# Patient Record
Sex: MALE | Race: WHITE | NOT HISPANIC OR LATINO | Employment: FULL TIME | URBAN - METROPOLITAN AREA
[De-identification: names, ages, dates, MRNs, and addresses within clinical notes are randomized per-mention and may not be internally consistent; named-entity substitution may affect disease eponyms.]

---

## 2017-11-27 ENCOUNTER — HOSPITAL ENCOUNTER (OUTPATIENT)
Facility: HOSPITAL | Age: 27
Setting detail: OUTPATIENT SURGERY
Discharge: HOME | End: 2017-11-28
Attending: EMERGENCY MEDICINE | Admitting: SURGERY
Payer: COMMERCIAL

## 2017-11-27 ENCOUNTER — OFFICE VISIT (OUTPATIENT)
Dept: URGENT CARE | Facility: MEDICAL CENTER | Age: 27
End: 2017-11-27
Payer: COMMERCIAL

## 2017-11-27 ENCOUNTER — APPOINTMENT (EMERGENCY)
Dept: CT IMAGING | Facility: HOSPITAL | Age: 27
End: 2017-11-27
Payer: COMMERCIAL

## 2017-11-27 DIAGNOSIS — K37 APPENDICITIS: Primary | ICD-10-CM

## 2017-11-27 LAB
ALBUMIN SERPL BCP-MCNC: 4.5 G/DL (ref 3.5–5)
ALP SERPL-CCNC: 68 U/L (ref 46–116)
ALT SERPL W P-5'-P-CCNC: 52 U/L (ref 12–78)
ANION GAP SERPL CALCULATED.3IONS-SCNC: 12 MMOL/L (ref 4–13)
AST SERPL W P-5'-P-CCNC: 31 U/L (ref 5–45)
BACTERIA UR QL AUTO: ABNORMAL /HPF
BASOPHILS # BLD AUTO: 0.02 THOUSANDS/ΜL (ref 0–0.1)
BASOPHILS NFR BLD AUTO: 0 % (ref 0–1)
BILIRUB SERPL-MCNC: 0.6 MG/DL (ref 0.2–1)
BILIRUB UR QL STRIP: NEGATIVE
BUN SERPL-MCNC: 18 MG/DL (ref 5–25)
CALCIUM SERPL-MCNC: 9.6 MG/DL (ref 8.3–10.1)
CHLORIDE SERPL-SCNC: 99 MMOL/L (ref 100–108)
CLARITY UR: CLEAR
CO2 SERPL-SCNC: 26 MMOL/L (ref 21–32)
COLOR UR: YELLOW
CREAT SERPL-MCNC: 0.92 MG/DL (ref 0.6–1.3)
EOSINOPHIL # BLD AUTO: 0.06 THOUSAND/ΜL (ref 0–0.61)
EOSINOPHIL NFR BLD AUTO: 0 % (ref 0–6)
ERYTHROCYTE [DISTWIDTH] IN BLOOD BY AUTOMATED COUNT: 12.6 % (ref 11.6–15.1)
GFR SERPL CREATININE-BSD FRML MDRD: 116 ML/MIN/1.73SQ M
GLUCOSE SERPL-MCNC: 126 MG/DL (ref 65–140)
GLUCOSE UR STRIP-MCNC: NEGATIVE MG/DL
HCT VFR BLD AUTO: 45.3 % (ref 36.5–49.3)
HGB BLD-MCNC: 15.7 G/DL (ref 12–17)
HGB UR QL STRIP.AUTO: ABNORMAL
KETONES UR STRIP-MCNC: ABNORMAL MG/DL
LACTATE SERPL-SCNC: 0.9 MMOL/L (ref 0.5–2)
LEUKOCYTE ESTERASE UR QL STRIP: NEGATIVE
LIPASE SERPL-CCNC: 88 U/L (ref 73–393)
LYMPHOCYTES # BLD AUTO: 1.82 THOUSANDS/ΜL (ref 0.6–4.47)
LYMPHOCYTES NFR BLD AUTO: 12 % (ref 14–44)
MCH RBC QN AUTO: 31.6 PG (ref 26.8–34.3)
MCHC RBC AUTO-ENTMCNC: 34.7 G/DL (ref 31.4–37.4)
MCV RBC AUTO: 91 FL (ref 82–98)
MONOCYTES # BLD AUTO: 0.93 THOUSAND/ΜL (ref 0.17–1.22)
MONOCYTES NFR BLD AUTO: 6 % (ref 4–12)
MUCOUS THREADS UR QL AUTO: ABNORMAL
NEUTROPHILS # BLD AUTO: 12.86 THOUSANDS/ΜL (ref 1.85–7.62)
NEUTS SEG NFR BLD AUTO: 82 % (ref 43–75)
NITRITE UR QL STRIP: NEGATIVE
NON-SQ EPI CELLS URNS QL MICRO: ABNORMAL /HPF
PH UR STRIP.AUTO: 5.5 [PH] (ref 4.5–8)
PLATELET # BLD AUTO: 186 THOUSANDS/UL (ref 149–390)
PMV BLD AUTO: 12.3 FL (ref 8.9–12.7)
POTASSIUM SERPL-SCNC: 4.2 MMOL/L (ref 3.5–5.3)
PROT SERPL-MCNC: 7.8 G/DL (ref 6.4–8.2)
PROT UR STRIP-MCNC: ABNORMAL MG/DL
RBC # BLD AUTO: 4.97 MILLION/UL (ref 3.88–5.62)
RBC #/AREA URNS AUTO: ABNORMAL /HPF
SODIUM SERPL-SCNC: 137 MMOL/L (ref 136–145)
SP GR UR STRIP.AUTO: >=1.03 (ref 1–1.03)
UROBILINOGEN UR QL STRIP.AUTO: 0.2 E.U./DL
WBC # BLD AUTO: 15.69 THOUSAND/UL (ref 4.31–10.16)
WBC #/AREA URNS AUTO: ABNORMAL /HPF

## 2017-11-27 PROCEDURE — 81001 URINALYSIS AUTO W/SCOPE: CPT | Performed by: EMERGENCY MEDICINE

## 2017-11-27 PROCEDURE — 80053 COMPREHEN METABOLIC PANEL: CPT | Performed by: EMERGENCY MEDICINE

## 2017-11-27 PROCEDURE — 96375 TX/PRO/DX INJ NEW DRUG ADDON: CPT

## 2017-11-27 PROCEDURE — 96374 THER/PROPH/DIAG INJ IV PUSH: CPT

## 2017-11-27 PROCEDURE — 83605 ASSAY OF LACTIC ACID: CPT | Performed by: EMERGENCY MEDICINE

## 2017-11-27 PROCEDURE — 36415 COLL VENOUS BLD VENIPUNCTURE: CPT | Performed by: EMERGENCY MEDICINE

## 2017-11-27 PROCEDURE — 96361 HYDRATE IV INFUSION ADD-ON: CPT

## 2017-11-27 PROCEDURE — 74177 CT ABD & PELVIS W/CONTRAST: CPT

## 2017-11-27 PROCEDURE — 99213 OFFICE O/P EST LOW 20 MIN: CPT

## 2017-11-27 PROCEDURE — 85025 COMPLETE CBC W/AUTO DIFF WBC: CPT | Performed by: EMERGENCY MEDICINE

## 2017-11-27 PROCEDURE — 83690 ASSAY OF LIPASE: CPT | Performed by: EMERGENCY MEDICINE

## 2017-11-27 RX ORDER — ONDANSETRON 2 MG/ML
4 INJECTION INTRAMUSCULAR; INTRAVENOUS ONCE
Status: COMPLETED | OUTPATIENT
Start: 2017-11-27 | End: 2017-11-27

## 2017-11-27 RX ORDER — SODIUM CHLORIDE 9 MG/ML
125 INJECTION, SOLUTION INTRAVENOUS CONTINUOUS
Status: DISCONTINUED | OUTPATIENT
Start: 2017-11-27 | End: 2017-11-28 | Stop reason: HOSPADM

## 2017-11-27 RX ADMIN — SODIUM CHLORIDE 1000 ML: 0.9 INJECTION, SOLUTION INTRAVENOUS at 23:16

## 2017-11-27 RX ADMIN — ONDANSETRON 4 MG: 2 INJECTION INTRAMUSCULAR; INTRAVENOUS at 23:16

## 2017-11-27 RX ADMIN — HYDROMORPHONE HYDROCHLORIDE 0.5 MG: 1 INJECTION, SOLUTION INTRAMUSCULAR; INTRAVENOUS; SUBCUTANEOUS at 23:18

## 2017-11-27 RX ADMIN — IOHEXOL 50 ML: 240 INJECTION, SOLUTION INTRATHECAL; INTRAVASCULAR; INTRAVENOUS; ORAL at 23:27

## 2017-11-27 NOTE — LETTER
November 28, 2017     Patient: Sneha Fowler   YOB: 1990   Date of Visit: 11/27/2017       To Whom It May Concern:    Please excuse Helen Nj from work duties from 11/28/17 thru 12/4/17  Please allow Helen Nj to perform light work duties until 12/18/17  If you have any questions or concerns, please don't hesitate to call           Sincerely,          Emmett Angeles PA-C

## 2017-11-28 ENCOUNTER — ANESTHESIA (OUTPATIENT)
Dept: PERIOP | Facility: HOSPITAL | Age: 27
End: 2017-11-28
Payer: COMMERCIAL

## 2017-11-28 ENCOUNTER — ANESTHESIA EVENT (OUTPATIENT)
Dept: PERIOP | Facility: HOSPITAL | Age: 27
End: 2017-11-28
Payer: COMMERCIAL

## 2017-11-28 VITALS
SYSTOLIC BLOOD PRESSURE: 130 MMHG | WEIGHT: 160 LBS | RESPIRATION RATE: 16 BRPM | OXYGEN SATURATION: 97 % | BODY MASS INDEX: 22.4 KG/M2 | HEIGHT: 71 IN | HEART RATE: 72 BPM | TEMPERATURE: 97 F | DIASTOLIC BLOOD PRESSURE: 73 MMHG

## 2017-11-28 PROBLEM — K37 APPENDICITIS: Status: ACTIVE | Noted: 2017-11-27

## 2017-11-28 LAB
BASOPHILS # BLD AUTO: 0.01 THOUSANDS/ΜL (ref 0–0.1)
BASOPHILS NFR BLD AUTO: 0 % (ref 0–1)
EOSINOPHIL # BLD AUTO: 0.05 THOUSAND/ΜL (ref 0–0.61)
EOSINOPHIL NFR BLD AUTO: 0 % (ref 0–6)
ERYTHROCYTE [DISTWIDTH] IN BLOOD BY AUTOMATED COUNT: 12.6 % (ref 11.6–15.1)
HCT VFR BLD AUTO: 46 % (ref 36.5–49.3)
HGB BLD-MCNC: 15.7 G/DL (ref 12–17)
LYMPHOCYTES # BLD AUTO: 1.58 THOUSANDS/ΜL (ref 0.6–4.47)
LYMPHOCYTES NFR BLD AUTO: 10 % (ref 14–44)
MCH RBC QN AUTO: 31.3 PG (ref 26.8–34.3)
MCHC RBC AUTO-ENTMCNC: 34.1 G/DL (ref 31.4–37.4)
MCV RBC AUTO: 92 FL (ref 82–98)
MONOCYTES # BLD AUTO: 1.04 THOUSAND/ΜL (ref 0.17–1.22)
MONOCYTES NFR BLD AUTO: 6 % (ref 4–12)
NEUTROPHILS # BLD AUTO: 13.67 THOUSANDS/ΜL (ref 1.85–7.62)
NEUTS SEG NFR BLD AUTO: 84 % (ref 43–75)
PLATELET # BLD AUTO: 175 THOUSANDS/UL (ref 149–390)
PMV BLD AUTO: 12.1 FL (ref 8.9–12.7)
RBC # BLD AUTO: 5.01 MILLION/UL (ref 3.88–5.62)
WBC # BLD AUTO: 16.35 THOUSAND/UL (ref 4.31–10.16)

## 2017-11-28 PROCEDURE — 99285 EMERGENCY DEPT VISIT HI MDM: CPT

## 2017-11-28 PROCEDURE — 96361 HYDRATE IV INFUSION ADD-ON: CPT

## 2017-11-28 PROCEDURE — 85025 COMPLETE CBC W/AUTO DIFF WBC: CPT | Performed by: SURGERY

## 2017-11-28 PROCEDURE — 88304 TISSUE EXAM BY PATHOLOGIST: CPT | Performed by: SURGERY

## 2017-11-28 PROCEDURE — 96376 TX/PRO/DX INJ SAME DRUG ADON: CPT

## 2017-11-28 RX ORDER — DIPHENHYDRAMINE HYDROCHLORIDE 50 MG/ML
12.5 INJECTION INTRAMUSCULAR; INTRAVENOUS ONCE AS NEEDED
Status: DISCONTINUED | OUTPATIENT
Start: 2017-11-28 | End: 2017-11-28 | Stop reason: HOSPADM

## 2017-11-28 RX ORDER — OXYCODONE HYDROCHLORIDE AND ACETAMINOPHEN 5; 325 MG/1; MG/1
1 TABLET ORAL EVERY 4 HOURS PRN
Qty: 20 TABLET | Refills: 0 | Status: SHIPPED | OUTPATIENT
Start: 2017-11-28 | End: 2017-12-08

## 2017-11-28 RX ORDER — SODIUM CHLORIDE, SODIUM LACTATE, POTASSIUM CHLORIDE, CALCIUM CHLORIDE 600; 310; 30; 20 MG/100ML; MG/100ML; MG/100ML; MG/100ML
75 INJECTION, SOLUTION INTRAVENOUS CONTINUOUS
Status: DISCONTINUED | OUTPATIENT
Start: 2017-11-28 | End: 2017-11-28 | Stop reason: HOSPADM

## 2017-11-28 RX ORDER — ONDANSETRON 2 MG/ML
4 INJECTION INTRAMUSCULAR; INTRAVENOUS ONCE AS NEEDED
Status: DISCONTINUED | OUTPATIENT
Start: 2017-11-28 | End: 2017-11-28 | Stop reason: HOSPADM

## 2017-11-28 RX ORDER — METOCLOPRAMIDE HYDROCHLORIDE 5 MG/ML
10 INJECTION INTRAMUSCULAR; INTRAVENOUS ONCE AS NEEDED
Status: DISCONTINUED | OUTPATIENT
Start: 2017-11-28 | End: 2017-11-28 | Stop reason: HOSPADM

## 2017-11-28 RX ORDER — ONDANSETRON 2 MG/ML
4 INJECTION INTRAMUSCULAR; INTRAVENOUS EVERY 6 HOURS PRN
Status: DISCONTINUED | OUTPATIENT
Start: 2017-11-28 | End: 2017-11-28

## 2017-11-28 RX ORDER — OXYCODONE HYDROCHLORIDE AND ACETAMINOPHEN 5; 325 MG/1; MG/1
2 TABLET ORAL EVERY 4 HOURS PRN
Status: DISCONTINUED | OUTPATIENT
Start: 2017-11-28 | End: 2017-11-28 | Stop reason: HOSPADM

## 2017-11-28 RX ORDER — LIDOCAINE HYDROCHLORIDE 10 MG/ML
INJECTION, SOLUTION INFILTRATION; PERINEURAL AS NEEDED
Status: DISCONTINUED | OUTPATIENT
Start: 2017-11-28 | End: 2017-11-28 | Stop reason: SURG

## 2017-11-28 RX ORDER — FENTANYL CITRATE 50 UG/ML
INJECTION, SOLUTION INTRAMUSCULAR; INTRAVENOUS AS NEEDED
Status: DISCONTINUED | OUTPATIENT
Start: 2017-11-28 | End: 2017-11-28 | Stop reason: SURG

## 2017-11-28 RX ORDER — MIDAZOLAM HYDROCHLORIDE 1 MG/ML
INJECTION INTRAMUSCULAR; INTRAVENOUS AS NEEDED
Status: DISCONTINUED | OUTPATIENT
Start: 2017-11-28 | End: 2017-11-28 | Stop reason: SURG

## 2017-11-28 RX ORDER — GLYCOPYRROLATE 0.2 MG/ML
INJECTION INTRAMUSCULAR; INTRAVENOUS AS NEEDED
Status: DISCONTINUED | OUTPATIENT
Start: 2017-11-28 | End: 2017-11-28 | Stop reason: SURG

## 2017-11-28 RX ORDER — ACETAMINOPHEN 325 MG/1
650 TABLET ORAL EVERY 6 HOURS PRN
Status: DISCONTINUED | OUTPATIENT
Start: 2017-11-28 | End: 2017-11-28 | Stop reason: HOSPADM

## 2017-11-28 RX ORDER — CEFAZOLIN SODIUM 1 G/3ML
INJECTION, POWDER, FOR SOLUTION INTRAMUSCULAR; INTRAVENOUS AS NEEDED
Status: DISCONTINUED | OUTPATIENT
Start: 2017-11-28 | End: 2017-11-28 | Stop reason: SURG

## 2017-11-28 RX ORDER — ONDANSETRON 2 MG/ML
4 INJECTION INTRAMUSCULAR; INTRAVENOUS ONCE
Status: COMPLETED | OUTPATIENT
Start: 2017-11-28 | End: 2017-11-28

## 2017-11-28 RX ORDER — OXYCODONE HYDROCHLORIDE 5 MG/1
5 TABLET ORAL EVERY 4 HOURS PRN
Status: DISCONTINUED | OUTPATIENT
Start: 2017-11-28 | End: 2017-11-28 | Stop reason: HOSPADM

## 2017-11-28 RX ORDER — SODIUM CHLORIDE 9 MG/ML
INJECTION, SOLUTION INTRAVENOUS CONTINUOUS PRN
Status: DISCONTINUED | OUTPATIENT
Start: 2017-11-28 | End: 2017-11-28 | Stop reason: SURG

## 2017-11-28 RX ORDER — METOCLOPRAMIDE HYDROCHLORIDE 5 MG/ML
INJECTION INTRAMUSCULAR; INTRAVENOUS AS NEEDED
Status: DISCONTINUED | OUTPATIENT
Start: 2017-11-28 | End: 2017-11-28 | Stop reason: SURG

## 2017-11-28 RX ORDER — ROCURONIUM BROMIDE 10 MG/ML
INJECTION, SOLUTION INTRAVENOUS AS NEEDED
Status: DISCONTINUED | OUTPATIENT
Start: 2017-11-28 | End: 2017-11-28 | Stop reason: SURG

## 2017-11-28 RX ORDER — SODIUM CHLORIDE, SODIUM LACTATE, POTASSIUM CHLORIDE, CALCIUM CHLORIDE 600; 310; 30; 20 MG/100ML; MG/100ML; MG/100ML; MG/100ML
125 INJECTION, SOLUTION INTRAVENOUS CONTINUOUS
Status: DISCONTINUED | OUTPATIENT
Start: 2017-11-28 | End: 2017-11-28 | Stop reason: HOSPADM

## 2017-11-28 RX ORDER — ONDANSETRON 2 MG/ML
4 INJECTION INTRAMUSCULAR; INTRAVENOUS EVERY 4 HOURS PRN
Status: DISCONTINUED | OUTPATIENT
Start: 2017-11-28 | End: 2017-11-28 | Stop reason: HOSPADM

## 2017-11-28 RX ORDER — FENTANYL CITRATE/PF 50 MCG/ML
50 SYRINGE (ML) INJECTION
Status: DISCONTINUED | OUTPATIENT
Start: 2017-11-28 | End: 2017-11-28 | Stop reason: HOSPADM

## 2017-11-28 RX ORDER — PROPOFOL 10 MG/ML
INJECTION, EMULSION INTRAVENOUS AS NEEDED
Status: DISCONTINUED | OUTPATIENT
Start: 2017-11-28 | End: 2017-11-28 | Stop reason: SURG

## 2017-11-28 RX ORDER — MORPHINE SULFATE 4 MG/ML
4 INJECTION, SOLUTION INTRAMUSCULAR; INTRAVENOUS
Status: DISCONTINUED | OUTPATIENT
Start: 2017-11-28 | End: 2017-11-28 | Stop reason: HOSPADM

## 2017-11-28 RX ORDER — BUPIVACAINE HYDROCHLORIDE AND EPINEPHRINE 2.5; 5 MG/ML; UG/ML
INJECTION, SOLUTION EPIDURAL; INFILTRATION; INTRACAUDAL; PERINEURAL AS NEEDED
Status: DISCONTINUED | OUTPATIENT
Start: 2017-11-28 | End: 2017-11-28 | Stop reason: HOSPADM

## 2017-11-28 RX ORDER — ONDANSETRON 2 MG/ML
INJECTION INTRAMUSCULAR; INTRAVENOUS AS NEEDED
Status: DISCONTINUED | OUTPATIENT
Start: 2017-11-28 | End: 2017-11-28 | Stop reason: SURG

## 2017-11-28 RX ADMIN — OXYCODONE HYDROCHLORIDE 5 MG: 5 TABLET ORAL at 10:08

## 2017-11-28 RX ADMIN — SODIUM CHLORIDE: 0.9 INJECTION, SOLUTION INTRAVENOUS at 15:24

## 2017-11-28 RX ADMIN — METRONIDAZOLE 500 MG: 500 INJECTION, SOLUTION INTRAVENOUS at 10:40

## 2017-11-28 RX ADMIN — OXYCODONE HYDROCHLORIDE 5 MG: 5 TABLET ORAL at 05:39

## 2017-11-28 RX ADMIN — HYDROMORPHONE HYDROCHLORIDE 0.5 MG: 1 INJECTION, SOLUTION INTRAMUSCULAR; INTRAVENOUS; SUBCUTANEOUS at 15:41

## 2017-11-28 RX ADMIN — FENTANYL CITRATE 100 MCG: 50 INJECTION INTRAMUSCULAR; INTRAVENOUS at 15:24

## 2017-11-28 RX ADMIN — FENTANYL CITRATE 50 MCG: 50 INJECTION INTRAMUSCULAR; INTRAVENOUS at 16:21

## 2017-11-28 RX ADMIN — SODIUM CHLORIDE, SODIUM LACTATE, POTASSIUM CHLORIDE, AND CALCIUM CHLORIDE 125 ML/HR: .6; .31; .03; .02 INJECTION, SOLUTION INTRAVENOUS at 04:20

## 2017-11-28 RX ADMIN — ONDANSETRON 4 MG: 2 INJECTION INTRAMUSCULAR; INTRAVENOUS at 15:46

## 2017-11-28 RX ADMIN — HYDROMORPHONE HYDROCHLORIDE 0.5 MG: 1 INJECTION, SOLUTION INTRAMUSCULAR; INTRAVENOUS; SUBCUTANEOUS at 14:28

## 2017-11-28 RX ADMIN — ONDANSETRON 4 MG: 2 INJECTION INTRAMUSCULAR; INTRAVENOUS at 12:28

## 2017-11-28 RX ADMIN — NEOSTIGMINE METHYLSULFATE 3 MG: 1 INJECTION, SOLUTION INTRAMUSCULAR; INTRAVENOUS; SUBCUTANEOUS at 16:00

## 2017-11-28 RX ADMIN — SODIUM CHLORIDE 125 ML/HR: 0.9 INJECTION, SOLUTION INTRAVENOUS at 16:24

## 2017-11-28 RX ADMIN — HYDROMORPHONE HYDROCHLORIDE 0.5 MG: 1 INJECTION, SOLUTION INTRAMUSCULAR; INTRAVENOUS; SUBCUTANEOUS at 01:13

## 2017-11-28 RX ADMIN — SODIUM CHLORIDE 125 ML/HR: 0.9 INJECTION, SOLUTION INTRAVENOUS at 01:06

## 2017-11-28 RX ADMIN — CEFAZOLIN SODIUM 1000 MG: 1 SOLUTION INTRAVENOUS at 10:06

## 2017-11-28 RX ADMIN — CEFAZOLIN SODIUM 1000 MG: 1 SOLUTION INTRAVENOUS at 02:52

## 2017-11-28 RX ADMIN — LIDOCAINE HYDROCHLORIDE 50 MG: 10 INJECTION, SOLUTION INFILTRATION; PERINEURAL at 15:27

## 2017-11-28 RX ADMIN — HYDROMORPHONE HYDROCHLORIDE 0.5 MG: 1 INJECTION, SOLUTION INTRAMUSCULAR; INTRAVENOUS; SUBCUTANEOUS at 12:28

## 2017-11-28 RX ADMIN — ONDANSETRON 4 MG: 2 INJECTION INTRAMUSCULAR; INTRAVENOUS at 03:25

## 2017-11-28 RX ADMIN — HYDROMORPHONE HYDROCHLORIDE 0.5 MG: 1 INJECTION, SOLUTION INTRAMUSCULAR; INTRAVENOUS; SUBCUTANEOUS at 09:05

## 2017-11-28 RX ADMIN — GLYCOPYRROLATE 0.4 MG: 0.2 INJECTION, SOLUTION INTRAMUSCULAR; INTRAVENOUS at 16:00

## 2017-11-28 RX ADMIN — SODIUM CHLORIDE, SODIUM LACTATE, POTASSIUM CHLORIDE, AND CALCIUM CHLORIDE 125 ML/HR: .6; .31; .03; .02 INJECTION, SOLUTION INTRAVENOUS at 13:14

## 2017-11-28 RX ADMIN — IOHEXOL 100 ML: 350 INJECTION, SOLUTION INTRAVENOUS at 01:31

## 2017-11-28 RX ADMIN — MIDAZOLAM HYDROCHLORIDE 2 MG: 1 INJECTION, SOLUTION INTRAMUSCULAR; INTRAVENOUS at 15:24

## 2017-11-28 RX ADMIN — ROCURONIUM BROMIDE 30 MG: 10 INJECTION INTRAVENOUS at 15:27

## 2017-11-28 RX ADMIN — FENTANYL CITRATE 50 MCG: 50 INJECTION INTRAMUSCULAR; INTRAVENOUS at 16:16

## 2017-11-28 RX ADMIN — CEFAZOLIN SODIUM 1000 MG: 1 INJECTION, POWDER, FOR SOLUTION INTRAMUSCULAR; INTRAVENOUS at 15:34

## 2017-11-28 RX ADMIN — HYDROMORPHONE HYDROCHLORIDE 1 MG: 1 INJECTION, SOLUTION INTRAMUSCULAR; INTRAVENOUS; SUBCUTANEOUS at 15:54

## 2017-11-28 RX ADMIN — HYDROMORPHONE HYDROCHLORIDE 0.5 MG: 1 INJECTION, SOLUTION INTRAMUSCULAR; INTRAVENOUS; SUBCUTANEOUS at 15:46

## 2017-11-28 RX ADMIN — METOCLOPRAMIDE HYDROCHLORIDE 10 MG: 5 INJECTION INTRAMUSCULAR; INTRAVENOUS at 15:46

## 2017-11-28 RX ADMIN — METRONIDAZOLE 500 MG: 500 INJECTION, SOLUTION INTRAVENOUS at 03:32

## 2017-11-28 RX ADMIN — HYDROMORPHONE HYDROCHLORIDE 0.5 MG: 1 INJECTION, SOLUTION INTRAMUSCULAR; INTRAVENOUS; SUBCUTANEOUS at 03:25

## 2017-11-28 RX ADMIN — ONDANSETRON 4 MG: 2 INJECTION INTRAMUSCULAR; INTRAVENOUS at 01:10

## 2017-11-28 RX ADMIN — HYDROMORPHONE HYDROCHLORIDE 0.5 MG: 1 INJECTION, SOLUTION INTRAMUSCULAR; INTRAVENOUS; SUBCUTANEOUS at 06:50

## 2017-11-28 RX ADMIN — HYDROMORPHONE HYDROCHLORIDE 0.5 MG: 1 INJECTION, SOLUTION INTRAMUSCULAR; INTRAVENOUS; SUBCUTANEOUS at 18:45

## 2017-11-28 RX ADMIN — PROPOFOL 200 MG: 10 INJECTION, EMULSION INTRAVENOUS at 15:27

## 2017-11-28 NOTE — ED PROVIDER NOTES
History  Chief Complaint   Patient presents with    Abdominal Pain     Pt ambulatory on unit c/o RLQ abd pain since this morning  Sent here from urgent care  Denies additional symptoms  Patient is a 27year old male with gradual worsening periumbilical and now RLQ abdominal pain since 0600 this AM  No N/v/D  No fever  No urinary sx  No GI bleeding  No decreased appetite  No abdominal surgery  Was sent here from urgent care for probable appendicitis  Bumps in car ride aggrevated pain  No recent old records from this ED seen on computer system  [facility] checked on this patient and patient not found  History provided by:  Patient (fiance and future brother in law)   used: No    Abdominal Pain   Associated symptoms: no diarrhea, no fever, no nausea and no vomiting        None       History reviewed  No pertinent past medical history  History reviewed  No pertinent surgical history  History reviewed  No pertinent family history  I have reviewed and agree with the history as documented  Social History   Substance Use Topics    Smoking status: Former Smoker    Smokeless tobacco: Never Used    Alcohol use Yes        Review of Systems   Constitutional: Negative for appetite change and fever  Gastrointestinal: Positive for abdominal pain  Negative for blood in stool, diarrhea, nausea and vomiting  Genitourinary: Negative for difficulty urinating  All other systems reviewed and are negative        Physical Exam  ED Triage Vitals [11/27/17 2145]   Temperature Pulse Respirations Blood Pressure SpO2   98 7 °F (37 1 °C) 65 18 148/77 98 %      Temp Source Heart Rate Source Patient Position - Orthostatic VS BP Location FiO2 (%)   Oral Monitor Sitting Left arm --      Pain Score       5           Orthostatic Vital Signs  Vitals:    11/27/17 2145 11/28/17 0003 11/28/17 0027   BP: 148/77 135/79    Pulse: 65 (!) 51 61   Patient Position - Orthostatic VS: Sitting Lying Physical Exam   Constitutional: He is oriented to person, place, and time  He appears well-developed and well-nourished  He appears distressed (moderate)  HENT:   Head: Normocephalic and atraumatic  Mouth/Throat: Oropharynx is clear and moist    Eyes: No scleral icterus  Neck: Normal range of motion  Neck supple  Cardiovascular: Normal rate, regular rhythm and normal heart sounds  No murmur heard  Pulmonary/Chest: Effort normal and breath sounds normal  No stridor  No respiratory distress  Abdominal: Soft  Bowel sounds are normal  He exhibits no distension  There is tenderness (moderate RLQ)  There is rebound and guarding  Musculoskeletal: He exhibits no edema or deformity  Neurological: He is alert and oriented to person, place, and time  Skin: Skin is warm and dry  No rash noted  Psychiatric: He has a normal mood and affect  Nursing note and vitals reviewed        ED Medications  Medications   sodium chloride 0 9 % infusion (125 mL/hr Intravenous Restarted 11/28/17 0107)   ceFAZolin (ANCEF) IVPB (premix) 1,000 mg (not administered)   metroNIDAZOLE (FLAGYL) IVPB (premix) 500 mg (not administered)   ondansetron (ZOFRAN) injection 4 mg (4 mg Intravenous Given 11/27/17 2316)   HYDROmorphone (DILAUDID) 1 mg/mL injection 0 5 mg (0 5 mg Intravenous Given 11/27/17 2318)   sodium chloride 0 9 % bolus 1,000 mL (0 mL Intravenous Stopped 11/28/17 0106)   iohexol (OMNIPAQUE) 240 MG/ML solution 50 mL (50 mL Oral Given 11/27/17 2327)   HYDROmorphone (DILAUDID) 1 mg/mL injection 0 5 mg (0 5 mg Intravenous Given 11/28/17 0113)   ondansetron (ZOFRAN) injection 4 mg (4 mg Intravenous Given 11/28/17 0110)   iohexol (OMNIPAQUE) 350 MG/ML injection (MULTI-DOSE) 100 mL (100 mL Intravenous Given 11/28/17 0131)       Diagnostic Studies  Results Reviewed     Procedure Component Value Units Date/Time    Lactic acid, plasma [[id number]]  (Normal) Collected:  11/27/17 2313    Lab Status:  Final result Specimen: Blood from Arm, Right Updated:  11/27/17 2346     LACTIC ACID 0 9 mmol/L     Narrative:         Result may be elevated if tourniquet was used during collection  Urine Microscopic [[id number]]  (Abnormal) Collected:  11/27/17 2321    Lab Status:  Final result Specimen:  Urine from Urine, Clean Catch Updated:  11/27/17 2344     RBC, UA 2-4 (A) /hpf      WBC, UA None Seen /hpf      Epithelial Cells None Seen /hpf      Bacteria, UA None Seen /hpf      MUCOUS THREADS Occasional    CMP [[id number]]  (Abnormal) Collected:  11/27/17 2313    Lab Status:  Final result Specimen:  Blood from Arm, Right Updated:  11/27/17 2343     Sodium 137 mmol/L      Potassium 4 2 mmol/L      Chloride 99 (L) mmol/L      CO2 26 mmol/L      Anion Gap 12 mmol/L      BUN 18 mg/dL      Creatinine 0 92 mg/dL      Glucose 126 mg/dL      Calcium 9 6 mg/dL      AST 31 U/L      ALT 52 U/L      Alkaline Phosphatase 68 U/L      Total Protein 7 8 g/dL      Albumin 4 5 g/dL      Total Bilirubin 0 60 mg/dL      eGFR 116 ml/min/1 73sq m     Narrative:         National Kidney Disease Education Program recommendations are as follows:  GFR calculation is accurate only with a steady state creatinine  Chronic Kidney disease less than 60 ml/min/1 73 sq  meters  Kidney failure less than 15 ml/min/1 73 sq  meters      Lipase [[id number]]  (Normal) Collected:  11/27/17 2313    Lab Status:  Final result Specimen:  Blood from Arm, Right Updated:  11/27/17 2343     Lipase 88 u/L     UA w Reflex to Microscopic w Reflex to Culture [[id number]]  (Abnormal) Collected:  11/27/17 2321    Lab Status:  Final result Specimen:  Urine from Urine, Clean Catch Updated:  11/27/17 2327     Color, UA Yellow     Clarity, UA Clear     Specific Gravity, UA >=1 030     pH, UA 5 5     Leukocytes, UA Negative     Nitrite, UA Negative     Protein, UA Trace (A) mg/dl      Glucose, UA Negative mg/dl      Ketones, UA Trace (A) mg/dl      Urobilinogen, UA 0 2 E U /dl      Bilirubin, UA Negative Blood, UA Trace-Intact (A)    CBC and differential [[id number]]  (Abnormal) Collected:  11/27/17 2313    Lab Status:  Final result Specimen:  Blood from Arm, Right Updated:  11/27/17 2327     WBC 15 69 (H) Thousand/uL      RBC 4 97 Million/uL      Hemoglobin 15 7 g/dL      Hematocrit 45 3 %      MCV 91 fL      MCH 31 6 pg      MCHC 34 7 g/dL      RDW 12 6 %      MPV 12 3 fL      Platelets 087 Thousands/uL      Neutrophils Relative 82 (H) %      Lymphocytes Relative 12 (L) %      Monocytes Relative 6 %      Eosinophils Relative 0 %      Basophils Relative 0 %      Neutrophils Absolute 12 86 (H) Thousands/µL      Lymphocytes Absolute 1 82 Thousands/µL      Monocytes Absolute 0 93 Thousand/µL      Eosinophils Absolute 0 06 Thousand/µL      Basophils Absolute 0 02 Thousands/µL                  CT abdomen pelvis with contrast   ED Interpretation by Quin Schaumann, MD (11/28 3774)   COMPARISON:   No relevant prior studies available  FINDINGS:   Lower thorax: No acute findings   ABDOMEN:   Liver: Unremarkable  No mass  Gallbladder and bile ducts: Unremarkable  No calcified stones  No ductal dilation  Pancreas: Unremarkable  No mass  No ductal dilation  Spleen: Unremarkable  No splenomegaly  Adrenals: Unremarkable  No mass  Kidneys and ureters: Unremarkable  No solid mass  No hydronephrosis  Stomach and bowel: A few loops of small bowel with questionable wall thickening as can be seen   with enteritis  No obstruction  Appendix: The appendix is dilated measuring up to 1 3 centimeter and contains dense material   suggestive of appendicoliths  PELVIS:   Bladder: Unremarkable  No mass  Reproductive: Unremarkable as visualized  ABDOMEN and PELVIS:   Intraperitoneal space: Unremarkable  No free air  No significant fluid collection  Bones/joints: No acute fracture  No dislocation  Soft tissues: Unremarkable  Vasculature: Unremarkable  No abdominal aortic aneurysm  Lymph nodes: Unremarkable   No enlarged lymph nodes  IMPRESSION:   1  The appendix is dilated measuring up to 1 3 centimeter and contains dense material suggestive of   appendicoliths  Findings are suggestive of appendicitis  2  A few loops of small bowel with questionable wall thickening as can be seen with enteritis  Thank you for allowing us to participate in the care of your patient  Dictated and Authenticated by: Doc Grewal MD   11/28/2017 2:22 AM [place] Time (Ning [address] 1154)                 Procedures  Procedures       Phone Contacts  ED Phone Contact    ED Course  ED Course as of Nov 28 0228   [facility] Nov 27, 2017   2347 Pain improved  Tue Nov 28, 2017   0108 Patient nauseous now and pain is returning so IV zofran and more IV dilaudid ordered  MDM  Number of Diagnoses or Management Options  Diagnosis management comments: DDx including but not limited to: appendicitis, gastroenteritis, gastritis, PUD, GERD, gastroparesis, hepatitis, pancreatitis, colitis, enteritis, food poisoning, mesenteric adenitis, IBD, IBS, ileus, bowel obstruction, cholecystitis, biliary colic, choledocholithiasis, diverticulitis, internal hernia, renal colic, pyelonephritis, UTI         Amount and/or Complexity of Data Reviewed  Clinical lab tests: ordered and reviewed  Tests in the radiology section of CPT®: ordered and reviewed  Decide to obtain previous medical records or to obtain history from someone other than the patient: yes      CritCare Time    Disposition  Final diagnoses:   Appendicitis     Time reflects when diagnosis was documented in both MDM as applicable and the Disposition within this note     Time User Action Codes Description Comment    11/28/2017  2:27 AM Mackenzie Hahn Appendicitis       ED Disposition     ED Disposition Condition Comment    Admit  Case was discussed with surgical resident and the patient's admission status was agreed to be Admission Status: observation status to the service of Dr Vishnu Dejesus   Follow-up Information    None       Patient's Medications    No medications on file     No discharge procedures on file      ED Provider  Electronically Signed by           Carol Vega MD  11/28/17 7376

## 2017-11-28 NOTE — ANESTHESIA PREPROCEDURE EVALUATION
Review of Systems/Medical History  Patient summary reviewed  Chart reviewed      Cardiovascular  Negative cardio ROS    Pulmonary  Negative pulmonary ROS ,        GI/Hepatic      Comment: Acute appendicitis     Negative  ROS        Endo/Other  Negative endo/other ROS      GYN       Hematology  Negative hematology ROS      Musculoskeletal  Negative musculoskeletal ROS        Neurology  Negative neurology ROS      Psychology   Negative psychology ROS            Physical Exam    Airway    Mallampati score: I  TM Distance: >3 FB  Neck ROM: full     Dental   No notable dental hx     Cardiovascular  Comment: Negative ROS, Rhythm: regular, Rate: normal, Cardiovascular exam normal    Pulmonary  Pulmonary exam normal Breath sounds clear to auscultation,     Other Findings        Anesthesia Plan  ASA Score- 1 Emergent      Anesthesia Type- general with ASA Monitors  Additional Monitors:   Airway Plan: ETT  Induction- intravenous  Informed Consent- Anesthetic plan and risks discussed with patient  Recent labs personally reviewed:  Lab Results   Component Value Date    WBC 16 35 (H) 11/28/2017    HGB 15 7 11/28/2017     11/28/2017     Lab Results   Component Value Date     11/27/2017    K 4 2 11/27/2017    BUN 18 11/27/2017    CREATININE 0 92 11/27/2017    GLUCOSE 126 11/27/2017     I, Yen Steel MD, have personally seen and evaluated the patient prior to anesthetic care  I have reviewed the pre-anesthetic record, and other medical records if appropriate to the anesthetic care  If a CRNA is involved in the case, I have reviewed the CRNA assessment, if present, and agree  Risks/benefits and alternatives discussed with patient including possible PONV, sore throat, and possibility of rare anesthetic and surgical emergencies

## 2017-11-28 NOTE — CASE MANAGEMENT
Initial Clinical Review    Admission: Date/Time/Statement: 11/28/2017  0228 OBSERVATION    Orders Placed This Encounter   Procedures    Place in Observation (expected length of stay for this patient is less than two midnights)     Standing Status:   Standing     Number of Occurrences:   1     Order Specific Question:   Admitting Physician     Answer:   Mahad Ruby [[id number]]     Order Specific Question:   Level of Care     Answer:   Med Surg [[id number]]         ED: Date/Time/Mode of Arrival:   ED Arrival Information     Expected Arrival Acuity Means of Arrival Escorted By Service Admission Type    - 11/27/2017 21:35 Urgent Walk-In Self Surgery-General Urgent    Arrival Complaint    Abdominal Pain          Chief Complaint:   Chief Complaint   Patient presents with    Abdominal Pain     Pt ambulatory on unit c/o RLQ abd pain since this morning  Sent here from urgent care  Denies additional symptoms  History of Illness: 27year old male with gradual worsening periumbilical and now RLQ abdominal pain since 0600 this AM  No N/v/D  No fever  No urinary sx  No GI bleeding  No decreased appetite  No abdominal surgery  Was sent here from urgent care for probable appendicitis  Bumps in car ride aggrevated pain  ED Vital Signs:   ED Triage Vitals [11/27/17 2145]   Temperature Pulse Respirations Blood Pressure SpO2   98 7 °F (37 1 °C) 65 18 148/77 98 %      Temp Source Heart Rate Source Patient Position - Orthostatic VS BP Location FiO2 (%)   Oral Monitor Sitting Left arm --      Pain Score       5        Wt Readings from Last 1 Encounters:   11/28/17 72 3 kg (159 lb 6 3 oz)       Vital Signs (abnormal):  Pulse 51    Abnormal Labs/Diagnostic Test Results:   Cl 99  UA trace protein  Trace protein  Trace blood  Wbc 15 69    Ct abdomen - The appendix is dilated measuring up to 1 3 centimeter and contains dense material suggestive of  appendicoliths  Findings are suggestive of appendicitis    2  A few loops of small bowel Acute appendicitis                  Plan: NPO, OR for lap appy, pain control, zofran, dvt ppx, ancef/flagyl          This patient will require  <2 night hospital stay  Admission Orders:  11/28/2017 0228 OBSERVATION  Scheduled Meds:   cefazolin 1,000 mg Intravenous Q8H   enoxaparin 40 mg Subcutaneous Daily   metroNIDAZOLE 500 mg Intravenous Q8H     Continuous Infusions:   lactated ringers 125 mL/hr Last Rate: 125 mL/hr (11/28/17 0420)   sodium chloride 125 mL/hr Last Rate: 125 mL/hr (11/28/17 0107)     PRN Meds:   acetaminophen    HYDROmorphone  0 5 iv - used x 2      Ondansetron  4 mg iv - used x 1    oxyCODONE - used x 1      oxyCODONE    OTHER ORDERS:  Scds     Up as tolerated  NPO

## 2017-11-28 NOTE — OP NOTE
OPERATIVE REPORT  PATIENT NAME: Andrea Lopez    :  1990  MRN: [id number]  Pt Location: AN OR ROOM 01    SURGERY DATE: 2017    Surgeon(s) and Role:     * Luke Mix DO - Primary     * Lucille Hernandez MD - Assisting    Preop Diagnosis:  Appendicitis [K37]    Post-Op Diagnosis Codes:     * Appendicitis [K37]    Procedure(s) (LRB):  APPENDECTOMY LAPAROSCOPIC possible open (N/A)    Specimen(s):  ID Type Source Tests Collected by Time Destination   1 : appendix Tissue Appendix TISSUE EXAM Luke Mix DO 2017 1549        Estimated Blood Loss:   Minimal    Drains:       Anesthesia Type:   General    Operative Indications:  Appendicitis [K37]    Operative Findings    Acute appendicitis      Review of Systems/Medical History  Patient summary reviewed  Chart reviewed      Cardiovascular  Negative cardio ROS  Pulmonary  Negative pulmonary ROS ,    GI/Hepatic     Comment: Acute appendicitis   Negative  ROS    Endo/Other  Negative endo/other ROS  GYN   Hematology  Negative hematology ROS     Musculoskeletal  Negative musculoskeletal ROS    Neurology  Negative neurology ROS     Psychology   Negative psychology ROS       Height 71 inches weight 73 kg/160 lb BMI 22  ASA 1  Wound class 2  Complications:   None    Procedure and Technique:  The patient was brought into the operative suite and identified by visualization, conversation, and by arm band  He was given preoperative antibiotics  Athrombic pumps were placed  Once under general anesthesia a Echevarria catheter was placed under sterile technique  The abdomen was then prepped and draped in a sterile fashion  A timeout was performed and it was assured that the prep was dry  Local was then instilled in the about the umbilicus  The subcutaneous tissues were bluntly dissected with Kocher clamps down to the fascia  The fascia was lifted up and divided in the midline  The underlying peritoneum was entered with a hemostat   I then placed a 12 mm trocar under direct visualization  A pneumoperitoneum was then established  Two 5 mm trochars were then placed in the lower abdomen and along the right gutter  The patient was placed in Trendelenburg and rolled to his left to help expose the cecum and the appendix  Appendix was lifted up with a laparoscopic Allis clamp  Mesoappendix was divided with the variable speed of the Harmonic scalpel  The appendix was then taken off the cecum with a blue load of a laparoscopic Endo LAILA stapler  The appendix was placed into an Endo Catch bag  Copious irrigation was carried out along the right gutter, over the liver, and down in the pelvis  The appendix was then brought out through the umbilical port site  The trochars were removed and the pneumoperitoneum was released  Fascia at the umbilical site was closed using 0 Vicryl in a figure-of-eight fashion  More local was instilled irrigation was carried out of the trocar sites  Skin incisions are closed using 4-0 Monocryl and a subcuticular fashion  All wounds were washed and dried and sterile Histocrylwas applied  The patient was awakened in the operating room and returned to the recovery area in stable condition having tolerated the procedure well     I was present for the entire procedure    Patient Disposition:  PACU     SIGNATURE: Branden Powers DO  DATE: November 28, 2017  TIME: 4:03 PM

## 2017-11-28 NOTE — SOCIAL WORK
Pt's mother requested CM's presence in pts room  Pt and mother are concerned about the cost of pt's post-op meds and would like price checked prior to d/c  They are worried that if we wait until after the sx this afternoon the pharmacy here will be closed and CM will be gone for the day  I s/w Yaima Lundberg with surgery team who wrote script for percocet post op to send now  She states that he should not need anything other medications  CM sent script to [facility]  Awaiting return response  CM dept will follow

## 2017-11-28 NOTE — PLAN OF CARE
INFECTION - ADULT     Absence or prevention of progression during hospitalization Progressing     Absence of fever/infection during neutropenic period Progressing        Knowledge Deficit     Patient/family/caregiver demonstrates understanding of disease process, treatment plan, medications, and discharge instructions Progressing        PAIN - ADULT     Verbalizes/displays adequate comfort level or baseline comfort level Progressing        Potential for Falls     Patient will remain free of falls Progressing

## 2017-11-28 NOTE — DISCHARGE INSTRUCTIONS
Please call the office when you leave to schedule an appointment to be seen in 2 weeks    Activity:    Do not lift more than 10 pounds (a gallon of milk) for 1-2 weeks post-operatively                 Walking is encouraged  Normal daily activities including climbing steps are okay  Do not engage in strenuous activity or contact sports for 4-6 weeks post-operatively    Return to Work:   Okay to return to work in one week    Diet:   You may return to your normal heart healthy diet  Wound Care: Your wound is closed with dissolvable stitches and glue  It is okay to shower  Wash incision gently with soap and water and pat dry  Do not soak incisions in bath water or swim for two weeks  Do not apply any creams or ointments  Pain Medication:   Please take as directed  No driving while taking narcotic pain medications    Other:  If you have questions after discharge please call the office  If you have increased pain, fever >101 5, increased drainage, redness or a bad smell at your surgery site, please call us immediately or come directly to the Emergency Room        Laparoscopic Appendectomy   WHAT YOU NEED TO KNOW:   Laparoscopic appendectomy is surgery to remove your appendix  During this surgery, small incisions are made in your abdomen  A small scope and special tools are inserted through these incisions  A scope is a flexible tube with a light and camera on the end  DISCHARGE INSTRUCTIONS:   Medicines:   · Pain medicine: You may need medicine to take away or decrease pain  ? Learn how to take your medicine  Ask what medicine and how much you should take  Be sure you know how, when, and how often to take it  ? Do not wait until the pain is severe before you take your medicine  Tell caregivers if your pain does not decrease  ? Pain medicine can make you dizzy or sleepy  Prevent falls by calling someone when you get out of bed or if you need help  · Take your medicine as directed   Contact your healthcare provider if you think your medicine is not helping or if you have side effects  Tell him or her if you are allergic to any medicine  Keep a list of the medicines, vitamins, and herbs you take  Include the amounts, and when and why you take them  Bring the list or the pill bottles to follow-up visits  Carry your medicine list with you in case of an emergency  Follow up with your healthcare provider as directed: Write down your questions so you remember to ask them during your visits  Eat healthy foods: Choose healthy foods from all the food groups every day  Include whole-grain bread, cereal, rice, and pasta  Eat a variety of fruits and vegetables, including dark green and orange vegetables  Include dairy products such as low-fat milk, yogurt, and cheese  Choose protein sources, such as lean beef and chicken, fish, beans, eggs, and nuts  Ask how many servings of fats, oils, and sweets you should have each day, and if you need to be on a special diet  Wound care: Ok to shower in 24 hours, carefully wash the incisions with soap and water  If you have bandages, change them any time they get wet or dirty  Ask your healthcare provider for more information about wound care  Contact your healthcare provider if:   · You are not able to have a bowel movement  · You have a fever  · You have chills, a cough, or feel weak and achy  · You have nausea or vomiting  · You have questions or concerns about your surgery, condition, or care  Seek care immediately or call 911 if:   · Blood soaks through your bandage  · You feel full and cannot burp or vomit  · You have pus or a foul-smelling odor coming from your wound  · Your vomit is greenish, looks like coffee grounds, or has blood in it

## 2017-11-28 NOTE — H&P
History and Physical -General Surgery  Ginger Yip 27 y o  male MRN: [id number]  Unit/Bed#: -01 Encounter: [id number]        Reason for Consult / Principal Problem: abdominal pain    HPI: Ginger Yip is a 27y o  year old male who presents with Appendicitis  Pain since 6am yesterday that has localized in RLQ  Patient denies fevers/chills  Has some nausea  No past surgical history  Movement aggravates pain  Review of Systems   Constitutional: Positive for activity change  HENT: Negative  Eyes: Negative  Respiratory: Negative  Cardiovascular: Negative  Gastrointestinal: Positive for abdominal pain  Endocrine: Negative  Genitourinary: Negative  Musculoskeletal: Negative  Allergic/Immunologic: Negative  Neurological: Negative  Hematological: Negative  Psychiatric/Behavioral: Negative  Historical Information   History reviewed  No pertinent past medical history  History reviewed  No pertinent surgical history  Social History   History   Alcohol Use    Yes     History   Drug Use No     History   Smoking Status    Former Smoker   Smokeless Tobacco    Never Used     History reviewed  No pertinent family history      Meds/Allergies     Home medications:     Current Facility-Administered Medications   Medication Dose Route Frequency    acetaminophen (TYLENOL) tablet 650 mg  650 mg Oral Q6H PRN    ceFAZolin (ANCEF) IVPB (premix) 1,000 mg  1,000 mg Intravenous Q8H    enoxaparin (LOVENOX) subcutaneous injection 40 mg  40 mg Subcutaneous Daily    HYDROmorphone (DILAUDID) 1 mg/mL injection 0 5 mg  0 5 mg Intravenous Q2H PRN    lactated ringers infusion  125 mL/hr Intravenous Continuous    metroNIDAZOLE (FLAGYL) IVPB (premix) 500 mg  500 mg Intravenous Q8H    ondansetron (ZOFRAN) injection 4 mg  4 mg Intravenous Q6H PRN    oxyCODONE (ROXICODONE) IR tablet 5 mg  5 mg Oral Q4H PRN    oxyCODONE (ROXICODONE) IR tablet 5 mg  5 mg Oral Q4H PRN    sodium chloride 0 9 % infusion  125 mL/hr Intravenous Continuous       No Known Allergies    Objective     Blood pressure 143/85, pulse 55, temperature 98 1 °F (36 7 °C), temperature source Oral, resp  rate 20, height 5' 11" (1 803 m), weight 72 3 kg (159 lb 6 3 oz), SpO2 96 %      Intake/Output Summary (Last 24 hours) at 11/28/17 0721  Last data filed at 11/28/17 0106   Gross per 24 hour   Intake             3000 ml   Output                0 ml   Net             3000 ml       PHYSICAL EXAM  General appearance: alert and no distress  Skin: Skin color, texture, turgor normal  No rashes or lesions  Head: Normocephalic, without obvious abnormality  Heart: regular rate and rhythm, S1, S2 normal, no murmur, click, rub or gallop  Lungs: clear to auscultation bilaterally  Abdomen: soft, tender RLQ, decreased BS, no rebound or guarding  Back: negative  Rectal: deferred  Neurological: normal without focal findings    Lab Results:   Admission on 11/27/2017   Component Date Value    WBC 11/27/2017 15 69*    RBC 11/27/2017 4 97     Hemoglobin 11/27/2017 15 7     Hematocrit 11/27/2017 45 3     MCV 11/27/2017 91     MCH 11/27/2017 31 6     MCHC 11/27/2017 34 7     RDW 11/27/2017 12 6     MPV 11/27/2017 12 3     Platelets 46/99/9416 186     Neutrophils Relative 11/27/2017 82*    Lymphocytes Relative 11/27/2017 12*    Monocytes Relative 11/27/2017 6     Eosinophils Relative 11/27/2017 0     Basophils Relative 11/27/2017 0     Neutrophils Absolute 11/27/2017 12 86*    Lymphocytes Absolute 11/27/2017 1 82     Monocytes Absolute 11/27/2017 0 93     Eosinophils Absolute 11/27/2017 0 06     Basophils Absolute 11/27/2017 0 02     Sodium 11/27/2017 137     Potassium 11/27/2017 4 2     Chloride 11/27/2017 99*    CO2 11/27/2017 26     Anion Gap 11/27/2017 12     BUN 11/27/2017 18     Creatinine 11/27/2017 0 92     Glucose 11/27/2017 126     Calcium 11/27/2017 9 6     AST 11/27/2017 31     ALT 11/27/2017 52     Alkaline Phosphatase 11/27/2017 68     Total Protein 11/27/2017 7 8     Albumin 11/27/2017 4 5     Total Bilirubin 11/27/2017 0 60     eGFR 11/27/2017 116     Lipase 11/27/2017 88     Color, UA 11/27/2017 Yellow     Clarity, UA 11/27/2017 Clear     Specific Gravity, UA 11/27/2017 >=1 030     pH, UA 11/27/2017 5 5     Leukocytes, UA 11/27/2017 Negative     Nitrite, UA 11/27/2017 Negative     Protein, UA 11/27/2017 Trace*    Glucose, UA 11/27/2017 Negative     Ketones, UA 11/27/2017 Trace*    Urobilinogen, UA 11/27/2017 0 2     Bilirubin, UA 11/27/2017 Negative     Blood, UA 11/27/2017 Trace-Intact*    LACTIC ACID 11/27/2017 0 9     RBC, UA 11/27/2017 2-4*    WBC, UA 11/27/2017 None Seen     Epithelial Cells 11/27/2017 None Seen     Bacteria, UA 11/27/2017 None Seen     MUCOUS THREADS 11/27/2017 Occasional     WBC 11/28/2017 16 35*    RBC 11/28/2017 5 01     Hemoglobin 11/28/2017 15 7     Hematocrit 11/28/2017 46 0     MCV 11/28/2017 92     MCH 11/28/2017 31 3     MCHC 11/28/2017 34 1     RDW 11/28/2017 12 6     MPV 11/28/2017 12 1     Platelets 52/74/2868 175     Neutrophils Relative 11/28/2017 84*    Lymphocytes Relative 11/28/2017 10*    Monocytes Relative 11/28/2017 6     Eosinophils Relative 11/28/2017 0     Basophils Relative 11/28/2017 0     Neutrophils Absolute 11/28/2017 13 67*    Lymphocytes Absolute 11/28/2017 1 58     Monocytes Absolute 11/28/2017 1 04     Eosinophils Absolute 11/28/2017 0 05     Basophils Absolute 11/28/2017 0 01      Imaging Studies: I have personally reviewed pertinent reports  Acute appendicitis  No periappendiceal abscess or free intraperitoneal air  ASSESSMENT/PLAN:  Problem List     * (Principal)Appendicitis       Assessment: Acute appendicitis      Plan: NPO, OR for lap appy, pain control, zofran, dvt ppx, ancef/flagyl          This patient will require  <2 night hospital stay      Counseling / Coordination of Care  Total time spent today  20 minutes  Greater than 50% of total time was spent with the patient and / or family counseling and / or coordination of care

## 2017-11-28 NOTE — DISCHARGE SUMMARY
Discharge Summary - Rina Wisdom 27 y o  male MRN: [id number]    Unit/Bed#: -01 Encounter: [id number]    Admission Date: 11/27/2017   Discharge Date: 11/28/17    Admitting Diagnosis:   Appendicitis [K37]  Abdominal pain [R10 9]    Discharge Diagnoses: Principal Problem:    Appendicitis      Consultations:None     Procedures Performed: Laparoscopic appendectomy with Dr Tawana Naidu on 11/28/17    Hospital Course: Rina Wisdom is a 27 y o  male admitted on 11/27/17 for acute appendicitis  CT abdomen and pelvis with contrast supported diagnosis  Patient was taken to the OR on 11/28/17 for laparoscopic appendectomy  Patient tolerated procedure well  Family member of patient's filled percocet prescription prior to patient being discharged due to pharmacy hours  Patient was discharged on 11/28/17 in good condition with instructions to follow up with Dr Tawana Naidu in 2-3 weeks  Condition at Discharge: good     Discharge instructions/Information to patient and family:   See after visit summary for information provided to patient and family  Provisions for Follow-Up Care:  See after visit summary for information related to follow-up care and any pertinent home health orders  Disposition: See After Visit Summary for discharge disposition information  Planned Readmission: No    Discharge Statement   I spent 20 minutes discharging the patient  This time was spent on the day of discharge  I had direct contact with the patient on the day of discharge  Additional documentation is required if more than 30 minutes were spent on discharge  Discharge Medications:  See after visit summary for reconciled discharge medications provided to patient and family        Darcie Villalta PA-C

## 2017-11-28 NOTE — PROGRESS NOTES
Pt requesting pain medication  6/10 pain  Dilaudid given  Pt npo  Surgery to be scheduled this afternoon  Will continue to monitor

## 2017-11-29 NOTE — PROGRESS NOTES
Assessment    1  Acute right lower quadrant pain (767 [id number],270 10) (R10 31)    Discussion/Summary  Discussion Summary:   Patient since the ER for further evaluation and workup cannot be done At our facility  Medication Side Effects Reviewed: Possible side effects of new medications were reviewed with the patient/guardian today  Understands and agrees with treatment plan: The treatment plan was reviewed with the patient/guardian  The patient/guardian understands and agrees with the treatment plan   Counseling Documentation With Imm: The patient was counseled regarding diagnostic results,-- instructions for management,-- risk factor reductions,-- prognosis,-- patient and family education,-- risks and benefits of treatment options,-- importance of compliance with treatment  Follow Up Instructions: Follow Up with your Primary Care Provider in 1-2 days  If your symptoms worsen, go to the nearest [place] [address] Emergency Department  Chief Complaint  Chief Complaint Free Text Note Form: Started with abdominal pain early am, was tolerable most of the day, now much worse, pain is periumbilical, no nausea or vomiting, diarrhea yesterday,      History of Present Illness  HPI: This is a 61-year-old male complaining of abdominal pain since this morning  Patient reports abdominal pain started out as diffuse in now has localized more to right side  Patient reports abdominal pain is constant does not radiate anywhere  Patient reports pain on the right over here when hitting bumps  Patient denies any nausea, vomiting, fever, chills, diarrhea, blood in his stools, dark tarry stools  patient reports he has his gallbladder and appendix  Hospital Based Practices Required Assessment:  Pain Assessment  the patient states they have pain  The pain is located in the abdomen  (on a scale of 0 to 10, the patient rates the pain at 6 )  Abuse And Domestic Violence Screen   Yes, the patient is safe at home  -- The patient states no one is hurting them  Depression And Suicide Screen  No, the patient has not had thoughts of hurting themself  No, the patient has not felt depressed in the past 7 days  Prefered Language is  english  Primary Language is  english  Readiness To Learn: Receptive  Barriers To Learning: none  Preferred Learning: verbal      Review of Systems  Focused-Male:  Constitutional: no fever or chills, feels well, no tiredness, no recent weight loss or weight gain  ENT: no complaints of earache, no loss of hearing, no nosebleeds or nasal discharge, no sore throat or hoarseness  Cardiovascular: no complaints of slow or fast heart rate, no chest pain, no palpitations, no leg claudication or lower extremity edema  Respiratory: no complaints of shortness of breath, no wheezing or cough, no dyspnea on exertion, no orthopnea or PND  Gastrointestinal: no complaints of abdominal pain, no constipation, no nausea or vomiting, no diarrhea or bloody stools-- and-- as noted in HPI  Genitourinary: no complaints of dysuria or incontinence, no hesitancy, no nocturia, no genital lesion, no inadequacy of penile erection  Musculoskeletal: no complaints of arthralgia, no myalgia, no joint swelling or stiffness, no limb pain or swelling  Integumentary: no complaints of skin rash or lesion, no itching or dry skin, no skin wounds  Neurological: no complaints of headache, no confusion, no numbness or tingling, no dizziness or fainting  Past Medical History  Active Problems And Past Medical History Reviewed: The active problems and past medical history were reviewed and updated today  Family History  Mother    1  Family history of No known health problems  Father    2  Family history of No known health problems  Family History Reviewed: The family history was reviewed and updated today  Social History   · Former smoker (K65 50) (V32 183)  Social History Reviewed: The social history was reviewed and updated today  Surgical History  Surgical History Reviewed: The surgical history was reviewed and updated today  Current Meds   1  No Reported Medications Recorded  Medication List Reviewed: The medication list was reviewed and updated today  Allergies    1  No Known Drug Allergies    Vitals  Signs   Recorded: 27Nov2017 08:49PM   Temperature: 98 4 F  Heart Rate: 87  Respiration: 20  Systolic: 399  Diastolic: 87  Height: 5 ft 11 in  Weight: 162 lb   BMI Calculated: 22 59  BSA Calculated: 1 93  Pain Scale: 6    Physical Exam   Constitutional  General appearance: No acute distress, well appearing and well nourished  Pulmonary  Respiratory effort: No increased work of breathing or signs of respiratory distress  Auscultation of lungs: Clear to auscultation  Cardiovascular  Palpation of heart: Normal PMI, no thrills  Auscultation of heart: Normal rate and rhythm, normal S1 and S2, without murmurs  Examination of extremities for edema and/or varicosities: Normal    Abdomen  Abdomen: Abnormal   The abdomen was flat  There was moderate tenderness in the right lower quadrant  The abdomen was not firm-- and-- not rigid  Rebound tenderness was present  Guarding was present  There was a positive obturator sign, but-- negative Dave's sign,-- negative Rovsing's sign-- and-- negative psoas sign  The abdomen was normal to percussion  no CVA tenderness  Liver and spleen: No hepatomegaly or splenomegaly  Lymphatic  Palpation of lymph nodes in neck: No lymphadenopathy     Psychiatric  Orientation to person, place and time: Normal    Mood and affect: Normal        Signatures   Electronically signed by : Jl Gonzalez [facility]; Nov 28 2017  8:50AM EST                       (Author)    Electronically signed by : MARKUS Combs ; Nov 28 2017  2:33PM EST                       (Co-author)

## 2019-12-17 ENCOUNTER — OFFICE VISIT (OUTPATIENT)
Dept: INTERNAL MEDICINE CLINIC | Facility: CLINIC | Age: 29
End: 2019-12-17
Payer: COMMERCIAL

## 2019-12-17 VITALS
RESPIRATION RATE: 16 BRPM | HEART RATE: 56 BPM | OXYGEN SATURATION: 98 % | HEIGHT: 71 IN | DIASTOLIC BLOOD PRESSURE: 68 MMHG | BODY MASS INDEX: 23.38 KG/M2 | WEIGHT: 167 LBS | SYSTOLIC BLOOD PRESSURE: 112 MMHG

## 2019-12-17 DIAGNOSIS — L30.1 DYSHIDROSIS: ICD-10-CM

## 2019-12-17 DIAGNOSIS — Z00.00 ANNUAL PHYSICAL EXAM: Primary | ICD-10-CM

## 2019-12-17 DIAGNOSIS — Z13.6 SCREENING FOR HEART DISEASE: ICD-10-CM

## 2019-12-17 DIAGNOSIS — L30.9 ECZEMA, UNSPECIFIED TYPE: ICD-10-CM

## 2019-12-17 PROCEDURE — 99385 PREV VISIT NEW AGE 18-39: CPT | Performed by: PHYSICIAN ASSISTANT

## 2019-12-17 RX ORDER — TRIAMCINOLONE ACETONIDE 5 MG/G
CREAM TOPICAL 2 TIMES DAILY
Qty: 30 G | Refills: 5 | Status: SHIPPED | OUTPATIENT
Start: 2019-12-17 | End: 2019-12-31 | Stop reason: SDUPTHER

## 2019-12-17 NOTE — PROGRESS NOTES
Assessment/Plan:   Patient Instructions   General medical exam is good  Will give trial of triamcinolone cream for eczema outbreak and dyshidrosis  Will have patient do screening labs, and will review results when they are available  Patient also to get me information on father's diagnosis and name of dermatologist [place]  Recommend yearly physical exam   Give trial of OTC Debrox ear drops for left ear  If not improving make follow-up appointment for ear wax removal    Patient strongly encouraged to stop vaping  Quality Measures: Tobacco Cessation Counseling: Tobacco cessation counseling was provided  The patient is sincerely urged to quit consumption of tobacco  He is not ready to quit tobacco         Return in about 1 year (around 12/17/2020) for Annual physical          Diagnoses and all orders for this visit:    Annual physical exam    Eczema, unspecified type  -     CBC and differential; Future  -     triamcinolone (KENALOG) 0 5 % cream; Apply topically 2 (two) times a day    Dyshidrosis  -     triamcinolone (KENALOG) 0 5 % cream; Apply topically 2 (two) times a day    Screening for heart disease  -     Comprehensive metabolic panel; Future  -     Lipid panel; Future          Subjective:      Patient ID: Rohan Swan is a 34 y o  male  53-year-old white male presents to establish with this practice and for health assessment  Today he is accompanied by his wife and young son  According to the patient he has not been to a practitioner many years  He does have some concerns today  One relating to what he is calling eczema, with breakouts primarily on his hands and arms but also behind his neck and can be on legs and other parts of his body  Generally the rash appears a small bumps that are itchy  Lesions on his hands look like tiny little water blisters that break open  They are also very itchy    Over-the- creams and steroid preparations have not provided any relief  Patient has also been experiencing a clicking in his left hip  This has gradually been improving  He has been having a intermittent sharp pain in his left ear with decreased hearing at times  No other upper respiratory symptoms  History/PE as documented in the EMR  Reviewing with patient, he was strongly encouraged to stop his vaping behavior and his daily marijuana use  Patient has also been told by his father that the father has some type of heart disease that this patient should be checked out for  Patient will get the exact diagnosis and call me  Most likely he will require an echocardiogram       ALLERGIES:  No Known Allergies    CURRENT MEDICATIONS:    Current Outpatient Medications:     triamcinolone (KENALOG) 0 5 % cream, Apply topically 2 (two) times a day, Disp: 30 g, Rfl: 5    ACTIVE PROBLEM LIST:  Patient Active Problem List   Diagnosis    Appendicitis       PAST MEDICAL HISTORY:  History reviewed  No pertinent past medical history      PAST SURGICAL HISTORY:  Past Surgical History:   Procedure Laterality Date    [place] LAP,APPENDECTOMY N/A 11/28/2017    Procedure: APPENDECTOMY LAPAROSCOPIC possible open;  Surgeon: Josie Cotto DO;  Location: [facility];  Service: General       FAMILY HISTORY:  Family History   Problem Relation Age of Onset    Dementia Maternal Grandmother     Lymphoma Paternal Grandfather        SOCIAL HISTORY:  Social History     Socioeconomic History    Marital status: Single     Spouse name: Not on file    Number of children: Not on file    Years of education: Not on file    Highest education level: Not on file   Occupational History    Not on file   Social Needs    Financial resource strain: Not on file    Food insecurity:     Worry: Not on file     Inability: Not on file    Transportation needs:     Medical: Not on file     Non-medical: Not on file   Tobacco Use    Smoking status: Former Smoker    Smokeless tobacco: Current User  Tobacco comment: vape   Substance and Sexual Activity    Alcohol use: Yes     Alcohol/week: 10 0 standard drinks     Types: 10 Shots of liquor per week     Binge frequency: Weekly     Comment: OCCASIONAL    Drug use: Yes     Types: Marijuana     Comment: once daily    Sexual activity: Yes     Partners: Female   Lifestyle    Physical activity:     Days per week: Not on file     Minutes per session: Not on file    Stress: Not on file   Relationships    Social connections:     Talks on phone: Not on file     Gets together: Not on file     Attends Yazdanism service: Not on file     Active member of club or organization: Not on file     Attends meetings of clubs or organizations: Not on file     Relationship status: Not on file    Intimate partner violence:     Fear of current or ex partner: Not on file     Emotionally abused: Not on file     Physically abused: Not on file     Forced sexual activity: Not on file   Other Topics Concern    Not on file   Social History Narrative        2 children, wife pregnant        Dental care once yearly-brushes twice daily       Review of Systems   Constitutional: Negative for activity change, chills, fatigue and fever  HENT: Positive for ear pain and hearing loss  Negative for congestion, postnasal drip, rhinorrhea, sinus pressure, sinus pain, sneezing and sore throat  Eyes: Negative for discharge, redness, itching and visual disturbance  Respiratory: Negative for cough, chest tightness and shortness of breath  Cardiovascular: Negative for chest pain, palpitations and leg swelling  Gastrointestinal: Negative for abdominal pain, blood in stool, constipation, diarrhea, nausea and vomiting  Endocrine: Negative for polydipsia, polyphagia and polyuria  Genitourinary: Negative for difficulty urinating  Musculoskeletal: Positive for arthralgias  Negative for myalgias  Skin: Positive for rash          As per HPI   Allergic/Immunologic: Negative for immunocompromised state  Neurological: Negative for dizziness, syncope, weakness, light-headedness and headaches  Hematological: Negative for adenopathy  Does not bruise/bleed easily  Psychiatric/Behavioral: Negative for dysphoric mood, sleep disturbance and suicidal ideas  The patient is not nervous/anxious  Objective:  Vitals:    12/17/19 1516   BP: 112/68   BP Location: Left arm   Patient Position: Sitting   Cuff Size: Adult   Pulse: 56   Resp: 16   SpO2: 98%   Weight: 75 8 kg (167 lb)   Height: 5' 11" (1 803 m)     Body mass index is 23 29 kg/m²  Physical Exam   Constitutional: He is oriented to person, place, and time  He appears well-developed and well-nourished  No distress  HENT:   Head: Normocephalic  Right Ear: External ear normal    Left Ear: External ear normal    Nose: Nose normal    Mouth/Throat: Oropharynx is clear and moist  No oropharyngeal exudate  Right tympanic membrane within normal limits  Left tympanic membrane not visualized due to cerumen in external auditory canal    Eyes: Pupils are equal, round, and reactive to light  Conjunctivae and EOM are normal  Right eye exhibits no discharge  Left eye exhibits no discharge  No scleral icterus  Neck: Normal range of motion  Neck supple  No JVD present  Carotid bruit is not present  No tracheal deviation present  No thyromegaly present  Cardiovascular: Normal rate, regular rhythm, normal heart sounds and intact distal pulses  Exam reveals no gallop and no friction rub  No murmur heard  Pulmonary/Chest: Effort normal and breath sounds normal  No respiratory distress  He has no wheezes  He has no rales  He exhibits no tenderness  Abdominal: Soft  Bowel sounds are normal  He exhibits no distension and no mass  There is no hepatosplenomegaly  There is no tenderness  There is no rebound, no guarding and no CVA tenderness  Genitourinary:   Genitourinary Comments: Circumcised adult male    No lesions of the phallus, scrotum, or testes  No inguinal hernias  Musculoskeletal: Normal range of motion  He exhibits no edema, tenderness or deformity  Lymphadenopathy:     He has no cervical adenopathy  Neurological: He is alert and oriented to person, place, and time  He has normal reflexes  He displays normal reflexes  No cranial nerve deficit or sensory deficit  He exhibits normal muscle tone  Coordination normal    Skin: Skin is warm and dry  Rash (Papular eruptions on patient's dorsal hands, forearms, behind his neck ) noted  Psychiatric: He has a normal mood and affect  His behavior is normal  Judgment and thought content normal    Nursing note and vitals reviewed  RESULTS:    No results found for this or any previous visit (from the past 1008 hour(s))  This note was created with voice recognition software  Phonic, grammatical and spelling errors may be present within the note as a result

## 2019-12-17 NOTE — PATIENT INSTRUCTIONS
General medical exam is good  Will give trial of triamcinolone cream for eczema outbreak and dyshidrosis  Will have patient do screening labs, and will review results when they are available  Patient also to get me information on father's diagnosis and name of dermatologist [place]  Recommend yearly physical exam   Give trial of OTC Debrox ear drops for left ear    If not improving make follow-up appointment for ear wax removal

## 2019-12-31 DIAGNOSIS — L30.9 ECZEMA, UNSPECIFIED TYPE: ICD-10-CM

## 2019-12-31 DIAGNOSIS — Z82.49 FAMILY HISTORY OF HYPERTROPHIC CARDIOMYOPATHY: Primary | ICD-10-CM

## 2019-12-31 DIAGNOSIS — L30.1 DYSHIDROSIS: ICD-10-CM

## 2019-12-31 RX ORDER — TRIAMCINOLONE ACETONIDE 5 MG/G
CREAM TOPICAL 2 TIMES DAILY
Qty: 30 G | Refills: 5 | Status: SHIPPED | OUTPATIENT
Start: 2019-12-31 | End: 2020-11-30

## 2019-12-31 NOTE — TELEPHONE ENCOUNTER
I put in a referral to Dermatology to Dr Consuelo Mitchell, and to have echocardiogram done due to family history of hypertrophic cardiomyopathy

## 2019-12-31 NOTE — TELEPHONE ENCOUNTER
Spoke to patient he said that the cream is helping but it is still there  He would like a refill of this as well  Patient wanted to update you on his family history which is hypertropic cardiomyophathy  He also said that the [facility] [place] office opening in [facility] is the same group as [facility] in [place]    Dr Shaheen Yu

## 2020-08-22 DIAGNOSIS — L30.1 DYSHIDROSIS: ICD-10-CM

## 2020-08-22 DIAGNOSIS — L30.9 ECZEMA, UNSPECIFIED TYPE: ICD-10-CM

## 2020-08-31 RX ORDER — TRIAMCINOLONE ACETONIDE 5 MG/G
CREAM TOPICAL
Qty: 30 G | Refills: 5 | OUTPATIENT
Start: 2020-08-31

## 2020-11-30 DIAGNOSIS — L30.1 DYSHIDROSIS: ICD-10-CM

## 2020-11-30 DIAGNOSIS — L30.9 ECZEMA, UNSPECIFIED TYPE: ICD-10-CM

## 2020-11-30 RX ORDER — TRIAMCINOLONE ACETONIDE 5 MG/G
CREAM TOPICAL
Qty: 30 G | Refills: 5 | Status: SHIPPED | OUTPATIENT
Start: 2020-11-30 | End: 2022-03-30

## 2021-04-21 ENCOUNTER — OFFICE VISIT (OUTPATIENT)
Dept: URGENT CARE | Facility: MEDICAL CENTER | Age: 31
End: 2021-04-21
Payer: COMMERCIAL

## 2021-04-21 VITALS
RESPIRATION RATE: 18 BRPM | DIASTOLIC BLOOD PRESSURE: 62 MMHG | WEIGHT: 184 LBS | HEART RATE: 64 BPM | SYSTOLIC BLOOD PRESSURE: 122 MMHG | TEMPERATURE: 97.7 F | HEIGHT: 70 IN | OXYGEN SATURATION: 98 % | BODY MASS INDEX: 26.34 KG/M2

## 2021-04-21 DIAGNOSIS — L03.031 PARONYCHIA OF TOE OF RIGHT FOOT DUE TO INGROWN TOENAIL: Primary | ICD-10-CM

## 2021-04-21 DIAGNOSIS — L60.0 PARONYCHIA OF TOE OF RIGHT FOOT DUE TO INGROWN TOENAIL: Primary | ICD-10-CM

## 2021-04-21 PROCEDURE — G0382 LEV 3 HOSP TYPE B ED VISIT: HCPCS | Performed by: PHYSICIAN ASSISTANT

## 2021-04-21 PROCEDURE — S9083 URGENT CARE CENTER GLOBAL: HCPCS | Performed by: PHYSICIAN ASSISTANT

## 2021-04-21 RX ORDER — CEPHALEXIN 500 MG/1
500 CAPSULE ORAL EVERY 8 HOURS SCHEDULED
Qty: 21 CAPSULE | Refills: 0 | Status: SHIPPED | OUTPATIENT
Start: 2021-04-21 | End: 2021-04-28

## 2021-04-21 NOTE — PROGRESS NOTES
[facility]        NAME: Vinay Avina is a 31 y o  male  : 1990    MRN: [id number]  DATE: 2021  TIME: 7:14 PM    Assessment and Plan   Paronychia of toe of right foot due to ingrown toenail [L03 031, L60 0]  1  Paronychia of toe of right foot due to ingrown toenail  cephalexin (KEFLEX) 500 mg capsule          Patient Instructions    Tylenol or Motrin for pain   warm water soaks   Keflex as directed  Follow up with PCP in 3-5 days  Proceed to  ER if symptoms worsen  Chief Complaint     Chief Complaint   Patient presents with    Toe Pain     in grown to nail 5th toe right foot          History of Present Illness        Patient is a 73-year-old male who presents today with right foot ingrown toenail for the past day  He reports it is on his 5th toe  He reports there has been pus-like discharge from the edge of the nail with surrounding erythema and mild swelling  Denies pain of the joints of the toe  Did not injure the toe  Has been using warm water soaks with little relief  Review of Systems   Review of Systems   Constitutional: Negative for fever  Respiratory: Negative for shortness of breath  Cardiovascular: Negative for chest pain  Musculoskeletal: Negative for arthralgias  Skin: Positive for color change          Ingrown toenail         Current Medications       Current Outpatient Medications:     cephalexin (KEFLEX) 500 mg capsule, Take 1 capsule (500 mg total) by mouth every 8 (eight) hours for 7 days, Disp: 21 capsule, Rfl: 0    triamcinolone (KENALOG) 0 5 % cream, APPLY TO AFFECTED AREA TWICE A DAY (Patient not taking: Reported on 2021), Disp: 30 g, Rfl: 5    Current Allergies     Allergies as of 2021    (No Known Allergies)            The following portions of the patient's history were reviewed and updated as appropriate: allergies, current medications, past family history, past medical history, past social history, past surgical history and problem list      History reviewed  No pertinent past medical history  Past Surgical History:   Procedure Laterality Date    [place] LAP,APPENDECTOMY N/A 11/28/2017    Procedure: APPENDECTOMY LAPAROSCOPIC possible open;  Surgeon: Chris Montano DO;  Location: [facility];  Service: General       Family History   Problem Relation Age of Onset    Dementia Maternal Grandmother     Lymphoma Paternal Grandfather     Hypertrophic cardiomyopathy Other          Medications have been verified  Objective   /62   Pulse 64   Temp 97 7 °F (36 5 °C)   Resp 18   Ht 5' 10" (1 778 m)   Wt 83 5 kg (184 lb)   SpO2 98%   BMI 26 40 kg/m²        Physical Exam     Physical Exam  Constitutional:       General: He is not in acute distress  Appearance: Normal appearance  He is not ill-appearing  Cardiovascular:      Rate and Rhythm: Normal rate and regular rhythm  Pulmonary:      Effort: Pulmonary effort is normal    Musculoskeletal: Normal range of motion  Skin:     General: Skin is warm and dry  Findings: Erythema present  Neurological:      Mental Status: He is alert

## 2021-12-06 ENCOUNTER — TELEPHONE (OUTPATIENT)
Dept: INTERNAL MEDICINE CLINIC | Facility: CLINIC | Age: 31
End: 2021-12-06

## 2021-12-17 PROCEDURE — U0005 INFEC AGEN DETEC AMPLI PROBE: HCPCS | Performed by: INTERNAL MEDICINE

## 2021-12-17 PROCEDURE — U0003 INFECTIOUS AGENT DETECTION BY NUCLEIC ACID (DNA OR RNA); SEVERE ACUTE RESPIRATORY SYNDROME CORONAVIRUS 2 (SARS-COV-2) (CORONAVIRUS DISEASE [COVID-19]), AMPLIFIED PROBE TECHNIQUE, MAKING USE OF HIGH THROUGHPUT TECHNOLOGIES AS DESCRIBED BY CMS-2020-01-R: HCPCS | Performed by: INTERNAL MEDICINE

## 2022-03-24 ENCOUNTER — OFFICE VISIT (OUTPATIENT)
Dept: URGENT CARE | Facility: MEDICAL CENTER | Age: 32
End: 2022-03-24
Payer: COMMERCIAL

## 2022-03-24 VITALS
DIASTOLIC BLOOD PRESSURE: 96 MMHG | OXYGEN SATURATION: 96 % | SYSTOLIC BLOOD PRESSURE: 140 MMHG | HEART RATE: 82 BPM | RESPIRATION RATE: 18 BRPM | BODY MASS INDEX: 27.2 KG/M2 | TEMPERATURE: 97.6 F | HEIGHT: 70 IN | WEIGHT: 190 LBS

## 2022-03-24 DIAGNOSIS — S39.012A LUMBOSACRAL STRAIN, INITIAL ENCOUNTER: Primary | ICD-10-CM

## 2022-03-24 PROCEDURE — S9083 URGENT CARE CENTER GLOBAL: HCPCS | Performed by: PHYSICIAN ASSISTANT

## 2022-03-24 PROCEDURE — G0382 LEV 3 HOSP TYPE B ED VISIT: HCPCS | Performed by: PHYSICIAN ASSISTANT

## 2022-03-24 RX ORDER — NAPROXEN 500 MG/1
500 TABLET ORAL 2 TIMES DAILY WITH MEALS
Qty: 30 TABLET | Refills: 0 | Status: SHIPPED | OUTPATIENT
Start: 2022-03-24

## 2022-03-24 RX ORDER — METHOCARBAMOL 500 MG/1
500 TABLET, FILM COATED ORAL 4 TIMES DAILY
Qty: 30 TABLET | Refills: 0 | Status: SHIPPED | OUTPATIENT
Start: 2022-03-24

## 2022-03-24 NOTE — PATIENT INSTRUCTIONS
1  Alternate ice and warm compresses to the low back area 3-4 times daily for 20-30 minutes  2  Perform low back stretching exercises 3-4 times daily as demonstrated  3  Follow-up with your primary care doctor for symptoms persisting longer than 7-10 days       4  Go to the ER immediately for any worsening symptoms

## 2022-03-24 NOTE — PROGRESS NOTES
[facility]        NAME: Rochelle Eng is a 36 y o  male  : 1990    MRN: [id number]  DATE: 2022  TIME: 4:45 PM    Assessment and Plan   Lumbosacral strain, initial encounter [H10 720T]  1  Lumbosacral strain, initial encounter  naproxen (Naprosyn) 500 mg tablet    methocarbamol (ROBAXIN) 500 mg tablet         Patient Instructions     1  Alternate ice and warm compresses to the low back area 3-4 times daily for 20-30 minutes  2  Perform low back stretching exercises 3-4 times daily as demonstrated  3  Follow-up with your primary care doctor for symptoms persisting longer than 7-10 days       4  Go to the ER immediately for any worsening symptoms  Chief Complaint     Chief Complaint   Patient presents with    Back Pain     Pt  with left lower back pain that radiates down his left leg  Pain began yesterday after raking concrete         History of Present Illness       33-year-old male patient with a 1 day history of severe left-sided low back pain which she rates at a 7/10  Intermittent radiation of the pain into the left buttock and anterior thigh area  He denies any paresthesias, subjective weakness, bowel or bladder symptoms  He has had previous back strains but never as severe as this  He has tried no over-the-counter medications  Denies any abdominal pain  Review of Systems   Review of Systems   Constitutional: Negative for chills and fever  HENT: Negative for ear pain and sore throat  Eyes: Negative for pain and visual disturbance  Respiratory: Negative for cough and shortness of breath  Cardiovascular: Negative for chest pain and palpitations  Gastrointestinal: Negative for abdominal pain and vomiting  Genitourinary: Negative for dysuria and hematuria  Musculoskeletal: Positive for back pain  Negative for arthralgias  Skin: Negative for color change and rash  Neurological: Negative for seizures and syncope     All other systems reviewed and are negative  Current Medications       Current Outpatient Medications:     triamcinolone (KENALOG) 0 5 % cream, APPLY TO AFFECTED AREA TWICE A DAY, Disp: 30 g, Rfl: 5    methocarbamol (ROBAXIN) 500 mg tablet, Take 1 tablet (500 mg total) by mouth 4 (four) times a day, Disp: 30 tablet, Rfl: 0    naproxen (Naprosyn) 500 mg tablet, Take 1 tablet (500 mg total) by mouth 2 (two) times a day with meals, Disp: 30 tablet, Rfl: 0    Current Allergies     Allergies as of 03/24/2022    (No Known Allergies)            The following portions of the patient's history were reviewed and updated as appropriate: allergies, current medications, past family history, past medical history, past social history, past surgical history and problem list      Past Medical History:   Diagnosis Date    Eczema        Past Surgical History:   Procedure Laterality Date    APPENDECTOMY      [place] LAP,APPENDECTOMY N/A 11/28/2017    Procedure: APPENDECTOMY LAPAROSCOPIC possible open;  Surgeon: Kurt Siddiqui DO;  Location: [facility];  Service: General       Family History   Problem Relation Age of Onset    Dementia Maternal Grandmother     Lymphoma Paternal Grandfather     Hypertrophic cardiomyopathy Other          Medications have been verified  Objective   /96   Pulse 82   Temp 97 6 °F (36 4 °C)   Resp 18   Ht 5' 10" (1 778 m)   Wt 86 2 kg (190 lb)   SpO2 96%   BMI 27 26 kg/m²        Physical Exam     Physical Exam  Vitals and nursing note reviewed  Constitutional:       Appearance: Normal appearance  He is normal weight  HENT:      Head: Normocephalic and atraumatic  Nose: Nose normal    Eyes:      Conjunctiva/sclera: Conjunctivae normal       Pupils: Pupils are equal, round, and reactive to light  Cardiovascular:      Pulses: Normal pulses  Heart sounds: Normal heart sounds  Pulmonary:      Effort: Pulmonary effort is normal       Breath sounds: Normal breath sounds     Abdominal: Tenderness: There is no abdominal tenderness  There is no right CVA tenderness or left CVA tenderness  Musculoskeletal:      Cervical back: Normal range of motion  Comments: Left-sided paralumbar tenderness and spasm noted generally  Lumbar flexion decreased to the knee level  Extension is normal   Right side bending and rotation is normal   Left-sided side bending rotation is restricted due to pain  Lower extremity deep tender reflexes are normal intact as is distal neurovascular exam    Skin:     General: Skin is warm and dry  Findings: No rash  Neurological:      General: No focal deficit present  Mental Status: He is alert and oriented to person, place, and time  Sensory: No sensory deficit        Deep Tendon Reflexes: Reflexes normal    Psychiatric:         Mood and Affect: Mood normal          Behavior: Behavior normal

## 2022-03-25 ENCOUNTER — OFFICE VISIT (OUTPATIENT)
Dept: INTERNAL MEDICINE CLINIC | Facility: CLINIC | Age: 32
End: 2022-03-25
Payer: COMMERCIAL

## 2022-03-25 VITALS
BODY MASS INDEX: 27.69 KG/M2 | HEIGHT: 70 IN | OXYGEN SATURATION: 99 % | WEIGHT: 193.4 LBS | SYSTOLIC BLOOD PRESSURE: 118 MMHG | DIASTOLIC BLOOD PRESSURE: 86 MMHG | HEART RATE: 79 BPM | TEMPERATURE: 98 F

## 2022-03-25 DIAGNOSIS — M54.42 ACUTE LEFT-SIDED LOW BACK PAIN WITH LEFT-SIDED SCIATICA: Primary | ICD-10-CM

## 2022-03-25 PROBLEM — K37 APPENDICITIS: Status: RESOLVED | Noted: 2017-11-27 | Resolved: 2022-03-25

## 2022-03-25 PROCEDURE — 3008F BODY MASS INDEX DOCD: CPT | Performed by: INTERNAL MEDICINE

## 2022-03-25 PROCEDURE — 1036F TOBACCO NON-USER: CPT | Performed by: INTERNAL MEDICINE

## 2022-03-25 PROCEDURE — 3725F SCREEN DEPRESSION PERFORMED: CPT | Performed by: INTERNAL MEDICINE

## 2022-03-25 PROCEDURE — 99213 OFFICE O/P EST LOW 20 MIN: CPT | Performed by: INTERNAL MEDICINE

## 2022-03-25 RX ORDER — METHYLPREDNISOLONE 4 MG/1
TABLET ORAL
Qty: 21 EACH | Refills: 0 | Status: SHIPPED | OUTPATIENT
Start: 2022-03-25

## 2022-03-25 NOTE — PROGRESS NOTES
Assessment/Plan:        Continue present management but for any worsening I did send in a Medrol Dosepak in case  For therapy may want to consider seeing a chiropractor for maintenance  Quality Measures:       Return if symptoms worsen or fail to improve  No problem-specific Assessment & Plan notes found for this encounter  Diagnoses and all orders for this visit:    Acute left-sided low back pain with left-sided sciatica  -     methylPREDNISolone 4 MG tablet therapy pack; Use as directed on package          Subjective:      Patient ID: Melba aLne is a 36 y o  male  Patient comes in today complaining of sudden onset of low back pain radiating into the left buttock and down the left leg  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  Not sure what he did  No weakness in the leg  No bladder or bowel incontinence  Went to urgent care last night  Was given muscle relaxer and NSAID  That seems to be helping        ALLERGIES:  No Known Allergies    CURRENT MEDICATIONS:    Current Outpatient Medications:     methocarbamol (ROBAXIN) 500 mg tablet, Take 1 tablet (500 mg total) by mouth 4 (four) times a day, Disp: 30 tablet, Rfl: 0    naproxen (Naprosyn) 500 mg tablet, Take 1 tablet (500 mg total) by mouth 2 (two) times a day with meals, Disp: 30 tablet, Rfl: 0    triamcinolone (KENALOG) 0 5 % cream, APPLY TO AFFECTED AREA TWICE A DAY, Disp: 30 g, Rfl: 5    methylPREDNISolone 4 MG tablet therapy pack, Use as directed on package, Disp: 21 each, Rfl: 0    ACTIVE PROBLEM LIST:  Patient Active Problem List   Diagnosis    Family history of hypertrophic cardiomyopathy       PAST MEDICAL HISTORY:  Past Medical History:   Diagnosis Date    Eczema        PAST SURGICAL HISTORY:  Past Surgical History:   Procedure Laterality Date    APPENDECTOMY      [place] LAP,APPENDECTOMY N/A 11/28/2017    Procedure: APPENDECTOMY LAPAROSCOPIC possible open;  Surgeon: Ashley Murphy DO;  Location: [facility];  Service: General       FAMILY HISTORY:  Family History   Problem Relation Age of Onset    Dementia Maternal Grandmother     Lymphoma Paternal Grandfather     Hypertrophic cardiomyopathy Other        SOCIAL HISTORY:  Social History     Socioeconomic History    Marital status: Single     Spouse name: Not on file    Number of children: Not on file    Years of education: Not on file    Highest education level: Not on file   Occupational History    Not on file   Tobacco Use    Smoking status: Former Smoker    Smokeless tobacco: Current User    Tobacco comment: vape   Substance and Sexual Activity    Alcohol use: Yes     Alcohol/week: 10 0 standard drinks     Types: 10 Shots of liquor per week     Comment: OCCASIONAL    Drug use: Yes     Types: Marijuana     Comment: once daily    Sexual activity: Yes     Partners: Female   Other Topics Concern    Not on file   Social History Narrative        2 children, wife pregnant        Dental care once yearly-brushes twice daily     Social Determinants of Health     Financial Resource Strain: Not on file   Food Insecurity: Not on file   Transportation Needs: Not on file   Physical Activity: Not on file   Stress: Not on file   Social Connections: Not on file   Intimate Partner Violence: Not on file   Housing Stability: Not on file       Review of Systems   Constitutional: Negative for fever  Musculoskeletal: Positive for back pain  Objective:  Vitals:    03/25/22 1543   BP: 118/86   BP Location: Left arm   Patient Position: Sitting   Cuff Size: Adult   Pulse: 79   Temp: 98 °F (36 7 °C)   TempSrc: Tympanic   SpO2: 99%   Weight: 87 7 kg (193 lb 6 4 oz)   Height: 5' 10" (1 778 m)     Body mass index is 27 75 kg/m²  Physical Exam  Vitals and nursing note reviewed  Constitutional:       Appearance: Normal appearance  Musculoskeletal:         General: Tenderness (  Left lower back) present     Neurological:      Mental Status: He is alert  RESULTS:    No results found for this or any previous visit (from the past 1008 hour(s))  This note was created with voice recognition software  Phonic, grammatical and spelling errors may be present within the note as a result

## 2022-03-30 DIAGNOSIS — L30.9 ECZEMA, UNSPECIFIED TYPE: ICD-10-CM

## 2022-03-30 DIAGNOSIS — L30.1 DYSHIDROSIS: ICD-10-CM

## 2022-03-30 RX ORDER — TRIAMCINOLONE ACETONIDE 5 MG/G
CREAM TOPICAL
Qty: 30 G | Refills: 5 | Status: SHIPPED | OUTPATIENT
Start: 2022-03-30

## 2023-06-22 ENCOUNTER — RA CDI HCC (OUTPATIENT)
Dept: OTHER | Facility: HOSPITAL | Age: 33
End: 2023-06-22

## 2023-06-22 NOTE — PROGRESS NOTES
[facility]  coding opportunities       Chart reviewed, no opportunity found: CHART REVIEWED, NO OPPORTUNITY FOUND        Patients Insurance        Commercial Insurance: [address] [place] [address]

## 2023-07-12 ENCOUNTER — OFFICE VISIT (OUTPATIENT)
Dept: FAMILY MEDICINE CLINIC | Facility: MEDICAL CENTER | Age: 33
End: 2023-07-12
Payer: COMMERCIAL

## 2023-07-12 VITALS
BODY MASS INDEX: 28.73 KG/M2 | RESPIRATION RATE: 16 BRPM | DIASTOLIC BLOOD PRESSURE: 80 MMHG | TEMPERATURE: 99.1 F | HEART RATE: 71 BPM | WEIGHT: 194 LBS | SYSTOLIC BLOOD PRESSURE: 126 MMHG | HEIGHT: 69 IN

## 2023-07-12 DIAGNOSIS — Z13.29 THYROID DISORDER SCREEN: ICD-10-CM

## 2023-07-12 DIAGNOSIS — Z13.1 DIABETES MELLITUS SCREENING: ICD-10-CM

## 2023-07-12 DIAGNOSIS — H61.22 IMPACTED CERUMEN OF LEFT EAR: ICD-10-CM

## 2023-07-12 DIAGNOSIS — Z76.89 ENCOUNTER TO ESTABLISH CARE WITH NEW DOCTOR: Primary | ICD-10-CM

## 2023-07-12 DIAGNOSIS — Z13.220 LIPID SCREENING: ICD-10-CM

## 2023-07-12 DIAGNOSIS — Z23 IMMUNIZATION DUE: ICD-10-CM

## 2023-07-12 DIAGNOSIS — Z13.0 SCREENING FOR DISORDER OF BLOOD AND BLOOD-FORMING ORGANS: ICD-10-CM

## 2023-07-12 PROCEDURE — 90471 IMMUNIZATION ADMIN: CPT

## 2023-07-12 PROCEDURE — 69210 REMOVE IMPACTED EAR WAX UNI: CPT

## 2023-07-12 PROCEDURE — 99214 OFFICE O/P EST MOD 30 MIN: CPT

## 2023-07-12 PROCEDURE — 90715 TDAP VACCINE 7 YRS/> IM: CPT

## 2024-10-08 ENCOUNTER — RA CDI HCC (OUTPATIENT)
Dept: OTHER | Facility: HOSPITAL | Age: 34
End: 2024-10-08

## 2025-03-28 ENCOUNTER — TELEPHONE (OUTPATIENT)
Dept: DERMATOLOGY | Facility: CLINIC | Age: 35
End: 2025-03-28

## 2025-03-28 NOTE — TELEPHONE ENCOUNTER
Called pt to confirm his appt on 4/1/25 at 3:30 in the [facility] office but n/a, not able to leave a v/m. Will send [employer] message.

## (undated) DEVICE — SCD SEQUENTIAL COMPRESSION COMFORT SLEEVE MEDIUM KNEE LENGTH: Brand: KENDALL SCD

## (undated) DEVICE — ALLENTOWN LAP CHOLE APP PACK: Brand: CARDINAL HEALTH

## (undated) DEVICE — SUT VICRYL 0 UR-6 27 IN J603H

## (undated) DEVICE — ENDOPOUCH RETRIEVER SPECIMEN RETRIEVAL BAGS: Brand: ENDOPOUCH RETRIEVER

## (undated) DEVICE — ETS45 RELOAD STANDARD 45MM: Brand: ENDOPATH

## (undated) DEVICE — TRAY FOLEY 16FR URIMETER SURESTEP

## (undated) DEVICE — ADHESIVE SKN CLSR HISTOACRYL FLEX 0.5ML LF

## (undated) DEVICE — REM POLYHESIVE ADULT PATIENT RETURN ELECTRODE: Brand: VALLEYLAB

## (undated) DEVICE — ENDOPATH ETS-FLEX45 ARTICULATING ENDOSCOPIC LINEAR CUTTER, NO RELOAD: Brand: ENDOPATH

## (undated) DEVICE — CHLORAPREP HI-LITE 26ML ORANGE

## (undated) DEVICE — VIAL DECANTER

## (undated) DEVICE — ENDOPATH XCEL UNIVERSAL TROCAR STABLILITY SLEEVES: Brand: ENDOPATH XCEL

## (undated) DEVICE — ENDOPATH XCEL BLADELESS TROCARS WITH STABILITY SLEEVES: Brand: ENDOPATH XCEL

## (undated) DEVICE — IRRIG ENDO FLO TUBING

## (undated) DEVICE — TOWEL SET X-RAY

## (undated) DEVICE — LIGHT HANDLE COVER SLEEVE DISP BLUE STELLAR

## (undated) DEVICE — HARMONIC 1100 SHEARS, 36CM SHAFT LENGTH: Brand: HARMONIC

## (undated) DEVICE — GLOVE SRG BIOGEL ORTHOPEDIC 8

## (undated) DEVICE — INTENDED FOR TISSUE SEPARATION, AND OTHER PROCEDURES THAT REQUIRE A SHARP SURGICAL BLADE TO PUNCTURE OR CUT.: Brand: BARD-PARKER SAFETY BLADES SIZE 11, STERILE

## (undated) DEVICE — NEEDLE 22 G X 1 1/2 SAFETY

## (undated) DEVICE — SUT MONOCRYL 4-0 PS-2 27 IN Y426H